# Patient Record
Sex: FEMALE | Race: BLACK OR AFRICAN AMERICAN | URBAN - METROPOLITAN AREA
[De-identification: names, ages, dates, MRNs, and addresses within clinical notes are randomized per-mention and may not be internally consistent; named-entity substitution may affect disease eponyms.]

---

## 2017-02-27 ENCOUNTER — OUTPATIENT (OUTPATIENT)
Dept: OUTPATIENT SERVICES | Facility: HOSPITAL | Age: 57
LOS: 1 days | Discharge: HOME | End: 2017-02-27

## 2017-03-02 PROBLEM — Z00.00 ENCOUNTER FOR PREVENTIVE HEALTH EXAMINATION: Status: ACTIVE | Noted: 2017-03-02

## 2017-03-07 ENCOUNTER — APPOINTMENT (OUTPATIENT)
Dept: BREAST CENTER | Facility: CLINIC | Age: 57
End: 2017-03-07

## 2017-03-13 ENCOUNTER — RECORD ABSTRACTING (OUTPATIENT)
Age: 57
End: 2017-03-13

## 2017-03-13 DIAGNOSIS — Z78.9 OTHER SPECIFIED HEALTH STATUS: ICD-10-CM

## 2017-03-21 ENCOUNTER — APPOINTMENT (OUTPATIENT)
Dept: BREAST CENTER | Facility: CLINIC | Age: 57
End: 2017-03-21

## 2017-03-21 VITALS
WEIGHT: 200 LBS | HEIGHT: 69 IN | SYSTOLIC BLOOD PRESSURE: 130 MMHG | BODY MASS INDEX: 29.62 KG/M2 | DIASTOLIC BLOOD PRESSURE: 80 MMHG

## 2017-03-21 DIAGNOSIS — Z87.39 PERSONAL HISTORY OF OTHER DISEASES OF THE MUSCULOSKELETAL SYSTEM AND CONNECTIVE TISSUE: ICD-10-CM

## 2017-03-21 RX ORDER — CYCLOBENZAPRINE HCL 5 MG
5 TABLET ORAL
Refills: 0 | Status: ACTIVE | COMMUNITY

## 2017-06-27 DIAGNOSIS — Z12.31 ENCOUNTER FOR SCREENING MAMMOGRAM FOR MALIGNANT NEOPLASM OF BREAST: ICD-10-CM

## 2018-03-05 ENCOUNTER — OUTPATIENT (OUTPATIENT)
Dept: OUTPATIENT SERVICES | Facility: HOSPITAL | Age: 58
LOS: 1 days | Discharge: HOME | End: 2018-03-05

## 2018-03-05 DIAGNOSIS — Z12.31 ENCOUNTER FOR SCREENING MAMMOGRAM FOR MALIGNANT NEOPLASM OF BREAST: ICD-10-CM

## 2018-03-13 ENCOUNTER — APPOINTMENT (OUTPATIENT)
Dept: BREAST CENTER | Facility: CLINIC | Age: 58
End: 2018-03-13
Payer: MEDICAID

## 2018-03-13 VITALS
BODY MASS INDEX: 27.4 KG/M2 | HEART RATE: 68 BPM | WEIGHT: 185 LBS | OXYGEN SATURATION: 96 % | DIASTOLIC BLOOD PRESSURE: 84 MMHG | SYSTOLIC BLOOD PRESSURE: 116 MMHG | HEIGHT: 69 IN

## 2018-03-13 PROCEDURE — 99212 OFFICE O/P EST SF 10 MIN: CPT

## 2018-07-28 PROBLEM — Z78.9 ALCOHOL USE: Status: ACTIVE | Noted: 2017-03-13

## 2019-03-12 ENCOUNTER — APPOINTMENT (OUTPATIENT)
Dept: BREAST CENTER | Facility: CLINIC | Age: 59
End: 2019-03-12

## 2019-03-29 DIAGNOSIS — N64.4 MASTODYNIA: ICD-10-CM

## 2019-04-10 ENCOUNTER — FORM ENCOUNTER (OUTPATIENT)
Age: 59
End: 2019-04-10

## 2019-04-11 ENCOUNTER — OUTPATIENT (OUTPATIENT)
Dept: OUTPATIENT SERVICES | Facility: HOSPITAL | Age: 59
LOS: 1 days | Discharge: HOME | End: 2019-04-11
Payer: MEDICAID

## 2019-04-11 DIAGNOSIS — N64.4 MASTODYNIA: ICD-10-CM

## 2019-04-11 PROCEDURE — 76641 ULTRASOUND BREAST COMPLETE: CPT | Mod: 26,50

## 2019-04-11 PROCEDURE — 77066 DX MAMMO INCL CAD BI: CPT | Mod: 26

## 2019-04-11 PROCEDURE — G0279: CPT | Mod: 26

## 2019-05-09 ENCOUNTER — APPOINTMENT (OUTPATIENT)
Dept: BREAST CENTER | Facility: CLINIC | Age: 59
End: 2019-05-09
Payer: MEDICAID

## 2019-05-09 VITALS
TEMPERATURE: 97.9 F | BODY MASS INDEX: 25.48 KG/M2 | HEIGHT: 69 IN | WEIGHT: 172 LBS | DIASTOLIC BLOOD PRESSURE: 76 MMHG | SYSTOLIC BLOOD PRESSURE: 114 MMHG

## 2019-05-09 PROCEDURE — 99212 OFFICE O/P EST SF 10 MIN: CPT

## 2019-05-09 NOTE — ASSESSMENT
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  She has no prior complaints related to the breasts.  Her family history is significant for her father with colon cancer and her Shima Model risk assessment for breast cancer is 13.2% in her lifetime.  \par \par Bilateral diagnostic mammogram and ultrasound were performed in April due to complaints of an area of focal pain in the left breast.  Mammogram demonstrated scattered areas of fibroglandular density.  There are no suspicious masses, areas of architectural distortion or cluster of microcalcifications in either breast.  The spot magnification views of the symptomatic area show no suspicious findings.  Targeted left breast ultrasound did not demonstrate any solid or cystic lesions or areas of abnormal shadowing.  She mentions that her left breast pain has since resolved.\par \par She is here for evaluation of these findings.  At this time, these findings do not present the need for surgical intervention.  She has a benign clinical breast examination and no current complaints related to the breasts. For now, she will need a bilateral screening mammogram for April 2020, with subsequent follow up clinical breast examination.  I spent a total of 10 minutes of face to face time with this patient, greater than 50% of which was spent in counseling and/or coordination of care.  All of her questions were appropriately answered.\par

## 2019-05-09 NOTE — HISTORY OF PRESENT ILLNESS
[FreeTextEntry1] : Patient with Right atypical duct hyperplasia bordering on DCIS on stereotactic core biopsy of two areas 12/7/11; Central and RLIQ.\par Status post Right NLOC of 2 areas 2/1/12 demonstrating radial scar, focal sclerosing adenosis, intraductal hyperplasia, duct ectasia, and stromal fibrosis.\par \par Her family history is significant for her father with colon cancer.\par \par Her Shima Model risk assessment is:\par 1.6 % in five years \par 13.2 % in her lifetime. \par

## 2019-05-09 NOTE — DATA REVIEWED
[FreeTextEntry1] : EXAM: US BREAST COMPLETE BI \par EXAM: MG MAMMO DIAG W SARATH BI# \par PROCEDURE DATE: 04/11/2019 \par INTERPRETATION: Clinical History / Reason for exam: Patient presents with \par an area of focal pain in the medial aspect of the left breast. \par The patient reports her last clinical breast examination was performed \par within the past year. \par Spot magnification imaging of the symptomatic area was performed in addition \par to routine mammographic projections including tomosynthesis. \par Computer-aided detection was utilized in the interpretation of this \par examination. \par Comparison is made to the prior examinations dated March 5, 2018, February \par 27, 2017 and November 25, 2016. \par Breast composition:There are scattered areas of fibroglandular density. \par There are no suspicious masses, areas of architectural distortion or cluster \par of microcalcifications in either breast. The spot magnification views of the \par symptomatic area show no suspicious findings. \par Targeted Left Breast Sonogram: \par No solid/cystic lesions or areas of abnormal shadowing are seen. \par IMPRESSION:No mammographic or sonographic correlate for the reported area of \par focal pain in the left breast. Clinical follow-up is recommended and further \par management of this patient should be based on the level of clinical concern. \par Recommendation: Unless otherwise indicated by clinical findings, annual \par screening mammography recommended. \par BI-RADS Category 1: Negative \par ANGE ROMERO M.D., ATTENDING RADIOLOGIST \par This document has been electronically signed. Apr 11 2019 3:44PM

## 2019-05-09 NOTE — PHYSICAL EXAM
[No Supraclavicular Adenopathy] : no supraclavicular adenopathy [Examined in the supine and seated position] : examined in the supine and seated position [Symmetrical] : symmetrical [No dominant masses] : no dominant masses in right breast  [No dominant masses] : no dominant masses left breast [No Nipple Retraction] : no left nipple retraction [No Nipple Discharge] : no left nipple discharge [Breast Mass Right Breast ___cm] : no masses [Breast Mass Left Breast ___cm] : no masses [Breast Nipple Inversion] : nipples not inverted [Breast Nipple Retraction] : nipples not retracted [Breast Nipple Flattening] : nipples not flattened [Breast Nipple Fissures] : nipples not fissured [Breast Abnormal Lactation (Galactorrhea)] : no galactorrhea [Breast Abnormal Secretion Bloody Fluid] : no bloody discharge [Breast Abnormal Secretion Serous Fluid] : no serous discharge [Breast Abnormal Secretion Opalescent Fluid] : no milky discharge [No Axillary Lymphadenopathy] : no left axillary lymphadenopathy [No Edema] : no edema [No Swelling] : no swelling [Full ROM] : full range of motion [No Rashes] : no rashes [No Ulceration] : no ulceration

## 2020-06-03 ENCOUNTER — RESULT REVIEW (OUTPATIENT)
Age: 60
End: 2020-06-03

## 2020-06-03 ENCOUNTER — OUTPATIENT (OUTPATIENT)
Dept: OUTPATIENT SERVICES | Facility: HOSPITAL | Age: 60
LOS: 1 days | Discharge: HOME | End: 2020-06-03
Payer: SELF-PAY

## 2020-06-03 DIAGNOSIS — Z12.31 ENCOUNTER FOR SCREENING MAMMOGRAM FOR MALIGNANT NEOPLASM OF BREAST: ICD-10-CM

## 2020-06-03 PROCEDURE — 77067 SCR MAMMO BI INCL CAD: CPT | Mod: 26

## 2020-06-03 PROCEDURE — 77063 BREAST TOMOSYNTHESIS BI: CPT | Mod: 26

## 2020-12-16 ENCOUNTER — APPOINTMENT (OUTPATIENT)
Dept: BREAST CENTER | Facility: CLINIC | Age: 60
End: 2020-12-16

## 2021-06-10 ENCOUNTER — RESULT REVIEW (OUTPATIENT)
Age: 61
End: 2021-06-10

## 2021-06-10 ENCOUNTER — OUTPATIENT (OUTPATIENT)
Dept: OUTPATIENT SERVICES | Facility: HOSPITAL | Age: 61
LOS: 1 days | Discharge: HOME | End: 2021-06-10
Payer: COMMERCIAL

## 2021-06-10 DIAGNOSIS — R92.2 INCONCLUSIVE MAMMOGRAM: ICD-10-CM

## 2021-06-10 DIAGNOSIS — Z12.31 ENCOUNTER FOR SCREENING MAMMOGRAM FOR MALIGNANT NEOPLASM OF BREAST: ICD-10-CM

## 2021-06-10 PROCEDURE — 77067 SCR MAMMO BI INCL CAD: CPT | Mod: 26

## 2021-06-10 PROCEDURE — 76641 ULTRASOUND BREAST COMPLETE: CPT | Mod: 26,50

## 2021-06-10 PROCEDURE — 77063 BREAST TOMOSYNTHESIS BI: CPT | Mod: 26

## 2021-08-13 ENCOUNTER — APPOINTMENT (OUTPATIENT)
Dept: BREAST CENTER | Facility: CLINIC | Age: 61
End: 2021-08-13
Payer: COMMERCIAL

## 2021-08-13 VITALS
WEIGHT: 185 LBS | TEMPERATURE: 96.6 F | BODY MASS INDEX: 27.4 KG/M2 | DIASTOLIC BLOOD PRESSURE: 74 MMHG | SYSTOLIC BLOOD PRESSURE: 112 MMHG | HEIGHT: 69 IN

## 2021-08-13 PROCEDURE — 99212 OFFICE O/P EST SF 10 MIN: CPT

## 2021-08-13 NOTE — DATA REVIEWED
[FreeTextEntry1] : B/L Screening Mammo - 06/10/68187:\par MAMMOGRAM FINDINGS:\par Mammography was performed including the following views: bilateral craniocaudal with tomosynthesis, bilateral mediolateral oblique, and bilateral mediolateral oblique with tomosynthesis.  The examination includes digital synthetic 2D and digital tomosynthesis 3D images. Additional imaging analysis was performed using CAD (computer-aided detection) software.\par \par There are scattered areas of fibroglandular density.\par \par There is an area of benign architectural distortion corresponding to the site of surgery seen in the right breast.\par \par No suspicious mass, grouping of calcifications, or other abnormality is identified.\par \par IMPRESSION:\par There is no mammographic evidence of malignancy.\par \par RECOMMENDATION:\par Unless otherwise indicated by clinical findings, annual screening mammography recommended.\par \par ASSESSMENT:\par BI-RADS Category 2:  Benign\par \par \par B/L Breast Sono - 06/10/2021:\par Findings:\par \par Ultrasound:\par \par Bilateral whole breast ultrasound was performed.\par \par No solid or cystic mass noted in either breast. No right or left axillary lymphadenopathy.\par \par Impression: No sonographic evidence of malignancy.\par \par Recommendation: Unless otherwise indicated by clinical findings, annual screening mammography recommended.\par \par BI-RADS Category 1: Negative.\par

## 2021-08-13 NOTE — PHYSICAL EXAM
[Normocephalic] : normocephalic [Atraumatic] : atraumatic [No Supraclavicular Adenopathy] : no supraclavicular adenopathy [No dominant masses] : no dominant masses in right breast  [No dominant masses] : no dominant masses left breast [No Nipple Discharge] : no left nipple discharge [de-identified] : scattered bilateral fibroglandular densities.  [de-identified] : well healed surgical scar.

## 2021-08-13 NOTE — ASSESSMENT
[FreeTextEntry1] : VERITO is a moreno 61 year old patient who presented today in follow up for a history of right breast ADH.  \par She has been doing well with no new breast related complaints. \par Imaging was done recently which revealed no mammographic or sonographic evidence of malignancy, \par as detailed above. \par Physical exam was unrevealing today.\par \par Imaging with a bilateral screening mammogram and sonogram will be due in June 2022, \par and that will be scheduled today. \par She will return for follow-up and clinical breast exam in June 2022 as well.\par \par The patient was informed that Dr. Irma Luna will no longer be practicing here as of the end of August 2021; her care will be continued with the practice.\par \par I spent a total of 15 minutes of face to face time with this patient, greater than 50% of which was spent in counseling and/or coordination of care.\par All of her questions were appropriately answered.\par She knows to call with any concerns.\par \par \par \par

## 2021-08-13 NOTE — HISTORY OF PRESENT ILLNESS
[FreeTextEntry1] : Patient with Right atypical duct hyperplasia bordering on DCIS on stereotactic core biopsy of two areas 12/7/11; Central and RLIQ.\par Status post Right NLOC of 2 areas 2/1/12 demonstrating radial scar, focal sclerosing adenosis, intraductal hyperplasia, duct ectasia, and stromal fibrosis.\par \par Her family history is significant for her father with colon cancer.\par \par Her Shima Model risk assessment is:\par 1.6 % in five years \par 13.2 % in her lifetime. \par \par \par VERITO RIVERS is a 61 year old female patient who presents today in follow up for history of right breast ADH.\par Since her last visit, she has no new breast related complaints. \par Imaging was done on 06/10/2021, which revealed no mammographic or sonographic evidence of malignancy.\par \par She presents today for evaluation and imaging review.\par \par

## 2022-06-15 ENCOUNTER — RESULT REVIEW (OUTPATIENT)
Age: 62
End: 2022-06-15

## 2022-06-15 ENCOUNTER — OUTPATIENT (OUTPATIENT)
Dept: OUTPATIENT SERVICES | Facility: HOSPITAL | Age: 62
LOS: 1 days | Discharge: HOME | End: 2022-06-15
Payer: COMMERCIAL

## 2022-06-15 DIAGNOSIS — R92.2 INCONCLUSIVE MAMMOGRAM: ICD-10-CM

## 2022-06-15 DIAGNOSIS — Z12.31 ENCOUNTER FOR SCREENING MAMMOGRAM FOR MALIGNANT NEOPLASM OF BREAST: ICD-10-CM

## 2022-06-15 PROCEDURE — 77067 SCR MAMMO BI INCL CAD: CPT | Mod: 26

## 2022-06-15 PROCEDURE — 77063 BREAST TOMOSYNTHESIS BI: CPT | Mod: 26

## 2022-06-15 PROCEDURE — 76641 ULTRASOUND BREAST COMPLETE: CPT | Mod: 26,50

## 2022-06-21 ENCOUNTER — APPOINTMENT (OUTPATIENT)
Dept: BREAST CENTER | Facility: CLINIC | Age: 62
End: 2022-06-21
Payer: COMMERCIAL

## 2022-06-21 VITALS
HEART RATE: 69 BPM | HEIGHT: 69 IN | BODY MASS INDEX: 27.7 KG/M2 | WEIGHT: 187 LBS | DIASTOLIC BLOOD PRESSURE: 79 MMHG | SYSTOLIC BLOOD PRESSURE: 123 MMHG

## 2022-06-21 PROCEDURE — 99212 OFFICE O/P EST SF 10 MIN: CPT

## 2022-06-21 NOTE — ASSESSMENT
[FreeTextEntry1] : VERITO is a moreno 62 year old patient who presented today in follow up for a history of right breast ADH.  \par She has been doing well with no new breast related complaints. \par \par Physical exam was unrevealing today.\par \par Her imaging is as follows:\par 06/15/2022 b/l mammo and US\par -scattered areas of fibroglandular density\par - an area of benign architectural distortion corresponding to the site of surgery seen in the right breast.\par BI-RADS 2 and US BIRADS1\par \par PLAN:\par -bilateral screening mammogram will be due in June 16, 2023\par and that will be scheduled today. \par She will return for follow-up and clinical breast exam in June 2023 as well.\par -referral to plastic surgery for possible reduction mammoplasty\par \par All of her questions were appropriately answered.\par She knows to call with any concerns.\par \par \par \par

## 2022-06-21 NOTE — DATA REVIEWED
[FreeTextEntry1] : [id number]     EXAM:  MG MAMMO SCREEN W SARATH BI#\par \par PROCEDURE DATE:  06/15/2022\par \par \par \par INTERPRETATION:  HISTORY:\par Bilateral MG MAMMO SCREEN W SARATH BI# was performed. Patient is 62 years old and is seen for screening. The patient has no personal history of cancer. The patient has a history of right excision at age 53 - benign. The patient has no family history of breast cancer.\par \par RISK ASSESSMENT:\par NCI Lifetime Risk: 6.7\par Tyrer-Cuzick Lifetime Risk: 8.9\par \par CLINICAL BREAST EXAM:\par The patient reports their last clinical breast exam was performed within the past year.\par \par COMPARISON STUDIES:\par The present examination has been compared to prior imaging studies performed at [facility] on 04/11/2019, 06/03/2020 and 06/10/2021.\par \par MAMMOGRAM FINDINGS:\par Mammography was performed including the following views: bilateral craniocaudal with tomosynthesis, bilateral mediolateral oblique with tomosynthesis, bilateral axillary tail, and right craniocaudal exaggerated laterally.  The examination includes digital synthetic 2D and digital tomosynthesis 3D images. Additional imaging analysis was performed using CAD (computer-aided detection) software.\par \par There are scattered areas of fibroglandular density.\par \par There is an area of benign architectural distortion corresponding to the site of surgery seen in the right breast.\par \par No suspicious mass, grouping of calcifications, or other abnormality is identified.\par \par IMPRESSION:\par There is no mammographic evidence of malignancy.\par \par RECOMMENDATION:\par Unless otherwise indicated by clinical findings, annual screening mammography recommended.\par \par ASSESSMENT:\par BI-RADS Category 2:  Benign\par \par  [id number]     EXAM:  MG US BREAST COMPLETE BI\par \par PROCEDURE DATE:  06/15/2022\par \par \par \par INTERPRETATION:  Clinical History / Reason for exam: Screening breast ultrasound.\par \par The patient reports her last clinical breast examination was performed about within the past year\par \par Family history of breast cancer: There is no family history of breast cancer.\par \par Comparisons: Breast ultrasound 6/10/2021 and 4/11/2019.\par \par Findings:\par \par Ultrasound:\par \par Bilateral whole breast ultrasound was performed including bilateral axilla.\par \par No suspicious solid or cystic mass is seen in either breast. No axillary lymphadenopathy.\par \par Impression: No sonographic evidence of malignancy.\par \par Recommendation: Unless otherwise indicated by clinical findings, annual screening mammography recommended.\par \par BI-RADS Category 1: Negative\par \par

## 2022-06-21 NOTE — HISTORY OF PRESENT ILLNESS
[FreeTextEntry1] : Patient with Right atypical duct hyperplasia bordering on DCIS on stereotactic core biopsy of two areas 12/7/11; Central and RLIQ.\par Status post Right NLOC of 2 areas 2/1/12 demonstrating radial scar, focal sclerosing adenosis, intraductal hyperplasia, duct ectasia, and stromal fibrosis.\par \par Her family history is significant for her father with colon cancer.\par \par Her Shima Model risk assessment is:\par 1.6 % in five years \par 13.2 % in her lifetime. \par \par \par VERITO RIVERS is a 61 year old female patient who presents today in follow up for history of right breast ADH.\par Since her last visit, she has no new breast related complaints. \par Imaging was done on 06/10/2021, which revealed no mammographic or sonographic evidence of malignancy.\par \par INTERVAL HISTORY 6/21/22\par Verito Toribio is here for her one year follow up visit. She has no breast related complaints at this time.  She denies any breast pain, has not palpated any new palpable masses in either breast and denies any nipple discharge or retraction.\par \par Her imaging is as follows:\par 06/15/2022 b/l mammo and US\par -scattered areas of fibroglandular density\par - an area of benign architectural distortion corresponding to the site of surgery seen in the right breast.\par BI-RADS 2 and US BIRADS1\par \par

## 2022-06-21 NOTE — PHYSICAL EXAM
[Normocephalic] : normocephalic [Atraumatic] : atraumatic [EOMI] : extra ocular movement intact [Examined in the supine and seated position] : examined in the supine and seated position [Symmetrical] : symmetrical [No dominant masses] : no dominant masses in right breast  [No dominant masses] : no dominant masses left breast [No Nipple Retraction] : no left nipple retraction [No Nipple Discharge] : no left nipple discharge [No Axillary Lymphadenopathy] : no left axillary lymphadenopathy [No Edema] : no edema [No Rashes] : no rashes [No Ulceration] : no ulceration [de-identified] : On physical exam, there are no discrete masses in either breast or axilla. There is no nipple discharge or inversion bilaterally. There are no skin changes bilaterally.\par \par

## 2023-06-21 ENCOUNTER — APPOINTMENT (OUTPATIENT)
Dept: BREAST CENTER | Facility: CLINIC | Age: 63
End: 2023-06-21

## 2024-01-23 ENCOUNTER — RESULT REVIEW (OUTPATIENT)
Age: 64
End: 2024-01-23

## 2024-01-23 ENCOUNTER — OUTPATIENT (OUTPATIENT)
Dept: OUTPATIENT SERVICES | Facility: HOSPITAL | Age: 64
LOS: 1 days | End: 2024-01-23
Payer: MEDICAID

## 2024-01-23 DIAGNOSIS — Z12.31 ENCOUNTER FOR SCREENING MAMMOGRAM FOR MALIGNANT NEOPLASM OF BREAST: ICD-10-CM

## 2024-01-23 PROCEDURE — 76641 ULTRASOUND BREAST COMPLETE: CPT | Mod: 26,50

## 2024-01-23 PROCEDURE — 76641 ULTRASOUND BREAST COMPLETE: CPT | Mod: 50

## 2024-01-23 PROCEDURE — 77063 BREAST TOMOSYNTHESIS BI: CPT | Mod: 26

## 2024-01-23 PROCEDURE — 77063 BREAST TOMOSYNTHESIS BI: CPT

## 2024-01-23 PROCEDURE — 77067 SCR MAMMO BI INCL CAD: CPT

## 2024-01-23 PROCEDURE — 77067 SCR MAMMO BI INCL CAD: CPT | Mod: 26

## 2024-01-24 DIAGNOSIS — Z12.31 ENCOUNTER FOR SCREENING MAMMOGRAM FOR MALIGNANT NEOPLASM OF BREAST: ICD-10-CM

## 2024-01-30 ENCOUNTER — APPOINTMENT (OUTPATIENT)
Dept: BREAST CENTER | Facility: CLINIC | Age: 64
End: 2024-01-30

## 2024-02-14 ENCOUNTER — APPOINTMENT (OUTPATIENT)
Dept: BREAST CENTER | Facility: CLINIC | Age: 64
End: 2024-02-14
Payer: MEDICAID

## 2024-02-14 VITALS
WEIGHT: 180 LBS | HEIGHT: 69 IN | DIASTOLIC BLOOD PRESSURE: 74 MMHG | SYSTOLIC BLOOD PRESSURE: 124 MMHG | BODY MASS INDEX: 26.66 KG/M2 | HEART RATE: 77 BPM

## 2024-02-14 DIAGNOSIS — N60.91 UNSPECIFIED BENIGN MAMMARY DYSPLASIA OF RIGHT BREAST: ICD-10-CM

## 2024-02-14 PROCEDURE — 99213 OFFICE O/P EST LOW 20 MIN: CPT

## 2024-02-14 NOTE — HISTORY OF PRESENT ILLNESS
[FreeTextEntry1] : Patient with Right atypical duct hyperplasia bordering on DCIS on stereotactic core biopsy of two areas 12/7/11; Central and RLIQ. Status post Right NLOC of 2 areas 2/1/12 demonstrating radial scar, focal sclerosing adenosis, intraductal hyperplasia, duct ectasia, and stromal fibrosis.  Her family history is significant for her father with colon cancer.  Her Shima Model risk assessment is: 1.6 % in five years  13.2 % in her lifetime.    VERITO RIVERS is a 63 year old female patient who presents today in follow up for history of right breast ADH. Since her last visit, she has no new breast related complaints.   Most recent imaging: B/L Screening Mammo - 01/23/2024: -There are scattered areas of fibroglandular density. -There is an area of benign architectural distortion corresponding to the site of surgery seen in the right breast. -No suspicious mass, grouping of calcifications, or other abnormality is identified. -There is no mammographic evidence of malignancy. BI-RADS Category 2:  Benign  B/L Breast Sono - 01/23/2024: -No solid or cystic mass noted in either breast.  -No right or left axillary lymphadenopathy. -No sonographic evidence of malignancy. BI-RADS Category 1: Negative  She presents today for evaluation and imaging review.

## 2024-02-14 NOTE — PHYSICAL EXAM
[Normocephalic] : normocephalic [Atraumatic] : atraumatic [No Supraclavicular Adenopathy] : no supraclavicular adenopathy [No dominant masses] : no dominant masses in right breast  [No dominant masses] : no dominant masses left breast [No Nipple Discharge] : no left nipple discharge [Breast Nipple Inversion] : nipples not inverted [Breast Nipple Retraction] : nipples not retracted [No Rashes] : no rashes [No Ulceration] : no ulceration [de-identified] : scattered bilateral fibroglandular densities.  [de-identified] : well healed surgical scar.  [de-identified] : No axillary lymphadenopathy appreciated. [de-identified] : No axillary lymphadenopathy appreciated.

## 2024-02-14 NOTE — DATA REVIEWED
[FreeTextEntry1] : B/L Screening Mammo - 01/23/2024: MAMMOGRAM FINDINGS: Mammography was performed including the following views: bilateral craniocaudal with tomosynthesis, bilateral mediolateral oblique with tomosynthesis.  The examination includes digital synthetic 2D and digital tomosynthesis 3D images. Additional imaging analysis was performed using CAD (computer-aided detection) software.  There are scattered areas of fibroglandular density.  There is an area of benign architectural distortion corresponding to the site of surgery seen in the right breast.  No suspicious mass, grouping of calcifications, or other abnormality is identified.  IMPRESSION: There is no mammographic evidence of malignancy.  RECOMMENDATION: Unless otherwise indicated by clinical findings, annual screening mammography recommended.  ASSESSMENT: BI-RADS Category 2:  Benign   B/L Breast Sono - 01/23/2024: Breast composition: There are scattered areas of fibroglandular density.  Findings:  Ultrasound:  Bilateral whole breast ultrasound was performed.  No solid or cystic mass noted in either breast. No right or left axillary lymphadenopathy.  Impression: No sonographic evidence of malignancy.  Recommendation: Unless otherwise indicated by clinical findings, annual screening mammography recommended.  BI-RADS Category 1: Negative

## 2024-02-14 NOTE — ASSESSMENT
[FreeTextEntry1] : VERITO is a moreno 63 year old patient who presented today in follow up for a history of right breast ADH.   She has been doing well with no new breast related complaints.   Most recent imaging: B/L Screening Mammo - 01/23/2024: -There are scattered areas of fibroglandular density. -There is an area of benign architectural distortion corresponding to the site of surgery seen in the right breast. -No suspicious mass, grouping of calcifications, or other abnormality is identified. -There is no mammographic evidence of malignancy. BI-RADS Category 2:  Benign  B/L Breast Sono - 01/23/2024: -No solid or cystic mass noted in either breast.  -No right or left axillary lymphadenopathy. -No sonographic evidence of malignancy. BI-RADS Category 1: Negative, as detailed above.   Physical exam today shows scattered bilateral fibroglandular densities; well healed right breast surgical scar.  Imaging with a bilateral screening mammogram and sonogram will be due in January 2025,  and that will be scheduled today.  She will return for follow-up and clinical breast exam in January 2025.    I spent a total of 20 minutes of face to face time with this patient, greater than 50% of which was spent in counseling and/or coordination of care. All of her questions were appropriately answered. She knows to call with any concerns.

## 2024-12-25 PROBLEM — F10.90 ALCOHOL USE: Status: ACTIVE | Noted: 2017-03-13

## 2025-01-24 ENCOUNTER — RESULT REVIEW (OUTPATIENT)
Age: 65
End: 2025-01-24

## 2025-01-24 ENCOUNTER — OUTPATIENT (OUTPATIENT)
Dept: OUTPATIENT SERVICES | Facility: HOSPITAL | Age: 65
LOS: 1 days | End: 2025-01-24
Payer: MEDICAID

## 2025-01-24 DIAGNOSIS — N60.91 UNSPECIFIED BENIGN MAMMARY DYSPLASIA OF RIGHT BREAST: ICD-10-CM

## 2025-01-24 DIAGNOSIS — R92.2 INCONCLUSIVE MAMMOGRAM: ICD-10-CM

## 2025-01-24 DIAGNOSIS — Z12.31 ENCOUNTER FOR SCREENING MAMMOGRAM FOR MALIGNANT NEOPLASM OF BREAST: ICD-10-CM

## 2025-01-24 PROCEDURE — 77063 BREAST TOMOSYNTHESIS BI: CPT

## 2025-01-24 PROCEDURE — 77067 SCR MAMMO BI INCL CAD: CPT | Mod: 26

## 2025-01-24 PROCEDURE — 76641 ULTRASOUND BREAST COMPLETE: CPT | Mod: 26,50

## 2025-01-24 PROCEDURE — 77067 SCR MAMMO BI INCL CAD: CPT

## 2025-01-24 PROCEDURE — 77063 BREAST TOMOSYNTHESIS BI: CPT | Mod: 26

## 2025-01-24 PROCEDURE — 76641 ULTRASOUND BREAST COMPLETE: CPT | Mod: 50

## 2025-01-25 DIAGNOSIS — R92.2 INCONCLUSIVE MAMMOGRAM: ICD-10-CM

## 2025-01-25 DIAGNOSIS — Z12.31 ENCOUNTER FOR SCREENING MAMMOGRAM FOR MALIGNANT NEOPLASM OF BREAST: ICD-10-CM

## 2025-01-29 ENCOUNTER — APPOINTMENT (OUTPATIENT)
Dept: BREAST CENTER | Facility: CLINIC | Age: 65
End: 2025-01-29
Payer: MEDICAID

## 2025-01-29 VITALS
HEIGHT: 69 IN | WEIGHT: 180 LBS | DIASTOLIC BLOOD PRESSURE: 81 MMHG | BODY MASS INDEX: 26.66 KG/M2 | SYSTOLIC BLOOD PRESSURE: 124 MMHG | HEART RATE: 69 BPM

## 2025-01-29 DIAGNOSIS — N60.91 UNSPECIFIED BENIGN MAMMARY DYSPLASIA OF RIGHT BREAST: ICD-10-CM

## 2025-01-29 PROCEDURE — 99213 OFFICE O/P EST LOW 20 MIN: CPT
